# Patient Record
Sex: MALE | Race: WHITE | Employment: UNEMPLOYED | URBAN - METROPOLITAN AREA
[De-identification: names, ages, dates, MRNs, and addresses within clinical notes are randomized per-mention and may not be internally consistent; named-entity substitution may affect disease eponyms.]

---

## 2017-02-06 ENCOUNTER — OFFICE VISIT (OUTPATIENT)
Dept: FAMILY MEDICINE CLINIC | Age: 37
End: 2017-02-06

## 2017-02-06 VITALS
RESPIRATION RATE: 16 BRPM | WEIGHT: 135.6 LBS | HEART RATE: 120 BPM | DIASTOLIC BLOOD PRESSURE: 88 MMHG | SYSTOLIC BLOOD PRESSURE: 136 MMHG | TEMPERATURE: 99.1 F

## 2017-02-06 DIAGNOSIS — Z23 NEED FOR DIPHTHERIA-TETANUS-PERTUSSIS (TDAP) VACCINE: ICD-10-CM

## 2017-02-06 DIAGNOSIS — L30.9 ECZEMA, UNSPECIFIED TYPE: ICD-10-CM

## 2017-02-06 DIAGNOSIS — I10 ESSENTIAL HYPERTENSION: Primary | ICD-10-CM

## 2017-02-06 DIAGNOSIS — G80.9 CEREBRAL PALSY, UNSPECIFIED TYPE (HCC): ICD-10-CM

## 2017-02-06 PROCEDURE — 99214 OFFICE O/P EST MOD 30 MIN: CPT | Performed by: FAMILY MEDICINE

## 2017-02-06 RX ORDER — TRIAMCINOLONE ACETONIDE 1 MG/G
CREAM TOPICAL
Qty: 45 G | Refills: 1 | Status: SHIPPED | OUTPATIENT
Start: 2017-02-06

## 2017-02-06 RX ORDER — BENAZEPRIL HYDROCHLORIDE 10 MG/1
TABLET ORAL
Qty: 90 TABLET | Refills: 3 | Status: SHIPPED | OUTPATIENT
Start: 2017-02-06

## 2017-02-06 ASSESSMENT — ENCOUNTER SYMPTOMS
CONSTIPATION: 0
DIARRHEA: 0
NAUSEA: 0
SHORTNESS OF BREATH: 0
EYES NEGATIVE: 1
COUGH: 0
ABDOMINAL PAIN: 0

## 2023-03-07 DIAGNOSIS — I10 HTN (HYPERTENSION), BENIGN: Primary | ICD-10-CM

## 2023-03-07 RX ORDER — BENAZEPRIL HYDROCHLORIDE 10 MG/1
TABLET ORAL
Qty: 30 TABLET | Refills: 5 | Status: SHIPPED | OUTPATIENT
Start: 2023-03-07 | End: 2023-11-07

## 2023-11-07 DIAGNOSIS — I10 HTN (HYPERTENSION), BENIGN: ICD-10-CM

## 2023-11-07 RX ORDER — BENAZEPRIL HYDROCHLORIDE 10 MG/1
TABLET ORAL
Qty: 30 TABLET | Refills: 5 | Status: SHIPPED | OUTPATIENT
Start: 2023-11-07 | End: 2023-12-29 | Stop reason: SDUPTHER

## 2023-11-07 NOTE — TELEPHONE ENCOUNTER
LOV: over 6 months  Pt needs appt for further refills  Please ask if a short supply is needed  Thank you!

## 2023-11-07 NOTE — TELEPHONE ENCOUNTER
Lmom for patient to set up appointment for refills and request short supply if needed    [phone]    Please refuse med

## 2023-12-01 ENCOUNTER — APPOINTMENT (OUTPATIENT)
Dept: PRIMARY CARE | Facility: CLINIC | Age: 43
End: 2023-12-01
Payer: MEDICARE

## 2023-12-27 PROBLEM — I10 ESSENTIAL HYPERTENSION: Status: ACTIVE | Noted: 2023-12-27

## 2023-12-27 PROBLEM — G80.9 CEREBRAL PALSY (MULTI): Status: ACTIVE | Noted: 2023-12-27

## 2023-12-27 PROBLEM — F41.9 ANXIETY: Status: ACTIVE | Noted: 2023-12-27

## 2023-12-27 NOTE — PROGRESS NOTES
"Subjective   Patient ID: Brant Campos is a 43 y.o. male who presents for Hypertension and Medicare Annual Wellness Visit Subsequent.  HPI    Patient presents in office today for a Medicare AWV, and follow-up on HTN. Patient does sometimes check BP at home. Last eye exam 2-3 years ago, last dental exam 1 year ago. No new family h/o of cancer or heart disease.      Taking current medications which were reviewed.  Problem list discussed.    Overall doing well.  Eating okay.  Staying active.    Has no other new problem /question.     ROS  Constitutional- No activity change. No appetite change.  Eyes- Denies vision changes.  Respiratory- No shortness of breath.  Cardiovascular- No palpitations. No chest pain.  GI- No nausea or vomiting. No diarrhea or constipation. Denies abdominal pain.  Musculoskeletal- Denies joint swelling.  Extremities- No edema.  Neurological- Denies headaches. Denies dizziness.  Skin- No rashes.  Psychiatric/Behavioral- Denies significant anxiety, or depressed mood.     Objective     /80   Pulse 72   Temp 36.6 °C (97.8 °F) (Temporal)   Resp 18   Ht 1.753 m (5' 9\")   SpO2 98%     No Known Allergies    Constitutional-- Well-nourished.  No distress  Head- unremarkable.  Eyes- PERRL.  Conjunctiva normal.  Nose- Normal.  No rhinorrhea noted.  Throat- Oropharynx is clear and moist.  Neck- Supple with no thyromegaly.  No significant cervical adenopathy noted.  Pulmonary/Chest- Breath sounds normal with normal effort.  No wheezing.  Heart- Regular rate and rhythm.  No murmur.  Abdomen- Soft and non-tender.  No masses noted.  Musculoskeletal-legs weak bilateral which is chronic.  Doing well in wheelchair.  No significant joint swelling  Extremities- No edema.   Neurological- Alert.   Skin- Warm.  No rashes.  Psychiatric/Behavioral- Mood and affect normal.  Behavior normal.     Assessment/Plan   1. Medicare annual wellness visit, subsequent        2. Essential hypertension  CBC and Auto " Differential    Comprehensive Metabolic Panel    Lipid Panel      3. Cerebral palsy, unspecified type (CMS/HCC)  CBC and Auto Differential    Comprehensive Metabolic Panel      4. Anxiety        5. HTN (hypertension), benign  benazepril (Lotensin) 10 mg tablet             Long talk. Treatment options reviewed.    Medicare wellness questionnaire reviewed in detail.   No problems with activities of daily living. Home safety issues reviewed.   Reminded to have an updated will if needed.    Reviewed most recent lab work with patient. Advised patient to remain up to date on routine maintenance and health screening.     Hypertension controlled.     Counseled the patient on anxiety.     Continue and take your medications as prescribed.    Health Maintenance issues discussed.  Importance of healthy diet and regular exercise reviewed.  Patient and his mom admits she does not exercise consistently and we talked about doing the therapy that is been recommended  Importance of healthy diet and regular exercise regimen discussed.    We will contact you with any test results ordered. If you do not hear from us, please contact.    Follow-up as instructed or sooner if any problems or symptoms do not resolve as expected.      Scribe Attestation  By signing my name below, IMeagan Scribe   attest that this documentation has been prepared under the direction and in the presence of Dutch Hanson MD.

## 2023-12-29 ENCOUNTER — OFFICE VISIT (OUTPATIENT)
Dept: PRIMARY CARE | Facility: CLINIC | Age: 43
End: 2023-12-29
Payer: MEDICARE

## 2023-12-29 VITALS
RESPIRATION RATE: 18 BRPM | HEART RATE: 72 BPM | DIASTOLIC BLOOD PRESSURE: 80 MMHG | SYSTOLIC BLOOD PRESSURE: 120 MMHG | OXYGEN SATURATION: 98 % | TEMPERATURE: 97.8 F | HEIGHT: 69 IN

## 2023-12-29 DIAGNOSIS — G80.9 CEREBRAL PALSY, UNSPECIFIED TYPE (MULTI): ICD-10-CM

## 2023-12-29 DIAGNOSIS — F41.9 ANXIETY: ICD-10-CM

## 2023-12-29 DIAGNOSIS — I10 ESSENTIAL HYPERTENSION: ICD-10-CM

## 2023-12-29 DIAGNOSIS — Z00.00 MEDICARE ANNUAL WELLNESS VISIT, SUBSEQUENT: Primary | ICD-10-CM

## 2023-12-29 DIAGNOSIS — I10 HTN (HYPERTENSION), BENIGN: ICD-10-CM

## 2023-12-29 PROCEDURE — 3074F SYST BP LT 130 MM HG: CPT | Performed by: FAMILY MEDICINE

## 2023-12-29 PROCEDURE — 1036F TOBACCO NON-USER: CPT | Performed by: FAMILY MEDICINE

## 2023-12-29 PROCEDURE — 3079F DIAST BP 80-89 MM HG: CPT | Performed by: FAMILY MEDICINE

## 2023-12-29 PROCEDURE — G0439 PPPS, SUBSEQ VISIT: HCPCS | Performed by: FAMILY MEDICINE

## 2023-12-29 PROCEDURE — 99213 OFFICE O/P EST LOW 20 MIN: CPT | Performed by: FAMILY MEDICINE

## 2023-12-29 RX ORDER — BENAZEPRIL HYDROCHLORIDE 10 MG/1
10 TABLET ORAL DAILY
Qty: 90 TABLET | Refills: 3 | Status: SHIPPED | OUTPATIENT
Start: 2023-12-29

## 2023-12-29 RX ORDER — DIAZEPAM 2 MG/1
TABLET ORAL
COMMUNITY
Start: 2022-05-26

## 2023-12-29 ASSESSMENT — ACTIVITIES OF DAILY LIVING (ADL)
BATHING: INDEPENDENT
TAKING_MEDICATION: INDEPENDENT
DRESSING: INDEPENDENT
DOING_HOUSEWORK: INDEPENDENT
GROCERY_SHOPPING: NEEDS ASSISTANCE
MANAGING_FINANCES: TOTAL CARE

## 2023-12-29 ASSESSMENT — PATIENT HEALTH QUESTIONNAIRE - PHQ9
2. FEELING DOWN, DEPRESSED OR HOPELESS: NOT AT ALL
SUM OF ALL RESPONSES TO PHQ9 QUESTIONS 1 AND 2: 0
1. LITTLE INTEREST OR PLEASURE IN DOING THINGS: NOT AT ALL

## 2024-03-22 ENCOUNTER — OFFICE VISIT (OUTPATIENT)
Dept: PRIMARY CARE | Facility: CLINIC | Age: 44
End: 2024-03-22
Payer: MEDICARE

## 2024-03-22 VITALS
SYSTOLIC BLOOD PRESSURE: 104 MMHG | DIASTOLIC BLOOD PRESSURE: 80 MMHG | OXYGEN SATURATION: 96 % | TEMPERATURE: 97.6 F | RESPIRATION RATE: 16 BRPM | HEART RATE: 95 BPM

## 2024-03-22 DIAGNOSIS — R29.898 WEAKNESS OF BOTH LOWER EXTREMITIES: ICD-10-CM

## 2024-03-22 DIAGNOSIS — I10 ESSENTIAL HYPERTENSION: ICD-10-CM

## 2024-03-22 DIAGNOSIS — F41.9 ANXIETY: ICD-10-CM

## 2024-03-22 DIAGNOSIS — G80.9 CEREBRAL PALSY, UNSPECIFIED TYPE (MULTI): Primary | ICD-10-CM

## 2024-03-22 PROCEDURE — 3074F SYST BP LT 130 MM HG: CPT | Performed by: FAMILY MEDICINE

## 2024-03-22 PROCEDURE — 99213 OFFICE O/P EST LOW 20 MIN: CPT | Performed by: FAMILY MEDICINE

## 2024-03-22 PROCEDURE — 3079F DIAST BP 80-89 MM HG: CPT | Performed by: FAMILY MEDICINE

## 2024-03-22 NOTE — PROGRESS NOTES
Subjective   Patient ID: Brant Campos is a 44 y.o. male who presents for Wheelchair Evaluation.  HPI  Pt is being for possible paperwork for wheelchair evaluation   Needs face-to-face evaluation.    Taking current medications which were reviewed.  Problem list discussed.     Overall doing well.  Eating okay.  Currently using his regular wheelchair     Has no other new problem /question.       ROS  Constitutional- No activity change. No appetite change.  Eyes- Denies vision changes.  Respiratory- No shortness of breath.  Cardiovascular- No palpitations. No chest pain.  GI- No nausea or vomiting. No diarrhea or constipation. Denies abdominal pain.  Musculoskeletal-decreased leg strength and upper arm strength  Extremities- No edema.  Neurological- Denies headaches.  Occasional dizziness and off balance issues related to his cerebral palsy  Psychiatric/Behavioral- Denies significant anxiety, or depressed mood.     Objective     /80 (BP Location: Right arm, Patient Position: Sitting, BP Cuff Size: Adult)   Pulse 95   Temp 36.4 °C (97.6 °F) (Temporal)   Resp 16   SpO2 96%     No Known Allergies    Constitutional-- Well-nourished.  No distress  Head- unremarkable.  Eyes- PERRL.  Conjunctiva normal.  Nose- Normal.  No rhinorrhea noted.  Throat- Oropharynx is clear and moist.  Neck- Supple with no thyromegaly.  No significant cervical adenopathy noted.  Pulmonary/Chest- Breath sounds normal with normal effort.  No wheezing.  Heart- Regular rate and rhythm.  No murmur.  Abdomen- Soft and non-tender.  No masses noted.  Musculoskeletal-bilateral upper arm weakness 2 out of 3 with lower extremity weakness 1 out of 3 bilaterally.  Poor balance and muscle tone in the lower extremities related to cerebral palsy.  Mild spasm noted.  Extremities- No edema.   Neurological- Alert.  No noted deficits.  Skin- Warm.        Assessment/Plan   1. Cerebral palsy, unspecified type (CMS/HCC)  Referral to Physical Therapy      2.  Essential hypertension        3. Anxiety        4. Weakness of both lower extremities  Referral to Physical Therapy             Long talk. Treatment options reviewed.  Patient has been using his current wheelchair which is very old.  He would greatly benefit from a power wheelchair.  Given his cerebral palsy and current medical conditions a walker or wheelchair will not suffice.  He does have the ability to use a power wheelchair.  The power wheelchair will allow him to continue to engage in his activities of daily living at home and allow him to be at the home    Continue and take your medications as prescribed.  Blood pressure controlled  Health Maintenance issues discussed.    Importance of healthy diet and regular exercise regimen discussed.      Follow-up as instructed or sooner if any problems or symptoms do not resolve as expected.

## 2024-03-27 ENCOUNTER — TELEPHONE (OUTPATIENT)
Dept: PRIMARY CARE | Facility: CLINIC | Age: 44
End: 2024-03-27
Payer: MEDICARE

## 2024-03-27 NOTE — TELEPHONE ENCOUNTER
Angie from [employer] called about faxing over a guide for a doctor to amend the 3/22/2024 note. Nadeen says it has to say he fully needs to chair for insurance purposes.     Nadeen says a guide must be used and we did not receive it via fax. I left a voicemail telling her this.

## 2024-04-25 ENCOUNTER — EVALUATION (OUTPATIENT)
Dept: PHYSICAL THERAPY | Facility: CLINIC | Age: 44
End: 2024-04-25
Payer: MEDICARE

## 2024-04-25 DIAGNOSIS — R26.9 GAIT ABNORMALITY: ICD-10-CM

## 2024-04-25 DIAGNOSIS — R53.1 GENERALIZED WEAKNESS: ICD-10-CM

## 2024-04-25 DIAGNOSIS — R29.898 WEAKNESS OF BOTH LOWER EXTREMITIES: ICD-10-CM

## 2024-04-25 DIAGNOSIS — M62.838 MUSCLE SPASTICITY: Primary | ICD-10-CM

## 2024-04-25 DIAGNOSIS — G80.9 CEREBRAL PALSY, UNSPECIFIED TYPE (MULTI): ICD-10-CM

## 2024-04-25 PROCEDURE — 97162 PT EVAL MOD COMPLEX 30 MIN: CPT | Mod: GP | Performed by: PHYSICAL THERAPIST

## 2024-04-25 ASSESSMENT — ENCOUNTER SYMPTOMS
LOSS OF SENSATION IN FEET: 0
DEPRESSION: 0
OCCASIONAL FEELINGS OF UNSTEADINESS: 1

## 2024-04-25 NOTE — PROGRESS NOTES
Physical Therapy    Physical Therapy Evaluation and Treatment      Patient Name: Brant Campos  MRN: [id number]  Today's Date: 4/25/2024  Time Calculation  Start Time: 1636  Stop Time: 1720  Time Calculation (min): 44 min    Insurance: [facility] A/B 90D  -VIANCA supplemental     Visit Number: 1    Assessment:   Patient is a 44 y.o. male who presents to PT with h/o CP and reports of LE weakness and difficulty walking that has progressed over the past year. Impairments found on exam include moderate-severe LE spasticity, generalized LE weakness, abnormal gait mechanics, and impaired gait endurance. Patient required min-mod A for short ambulatory distances d/t quick onset of LE fatigue/knee buckling and postural instability, but only CGA for all other functional tasks (bed mobility, transfers, scooting). Times on the 5xSTS and TUG indicate high fall risk and impaired functional LE strength and gait stability respectively. These impairments are primarily limiting the patient's ability to walk which is severely impacting patient's ability to negotiate home and community environments safely and efficiently. The impairments and functional limitations outlined above indicate a need for skilled PT services to establish strong flexibility/strengthening HEP, improved gait safety and stability, and assist the patient in returning to his prior level of function.    Standardized testing and measures administered today reveal that the patient has 5 impairments in body structures and functions, activity limitations, and participation restrictions. The patient has1 personal factors and comorbidities that may serve as barriers affecting the plan of care, including CP. The patient's clinical presentation includes evolving characteristics as noted during today's evaluation, & these findings indicate that this patient is of moderate complexity.     Plan:  OP PT Plan  Treatment/Interventions: Education/ Instruction, Gait training, Manual therapy,  "Neuromuscular re-education, Self care/ home management, Therapeutic activities, Therapeutic exercises  PT Plan: Skilled PT  PT Frequency:  (1-2x/week)  Duration: 10 visits  Onset Date: 04/25/23  Certification Period Start Date: 04/25/24  Certification Period End Date: 07/24/24  Rehab Potential: Good  Plan of Care Agreement: Patient    Next Visit: establish stronger stretching/strengthening HEP, standing strength in // bars (squats, terminal knee extension marching, LE taps, sideways walking), standing tolerance/endurance    Current Problem:   1. Muscle spasticity        2. Cerebral palsy, unspecified type (Multi)  Referral to Physical Therapy      3. Weakness of both lower extremities  Referral to Physical Therapy      4. Generalized weakness        5. Gait abnormality            Subjective    General:   Patient arrives to clinic mobilizing in manual W/C w/ mother, \"Teri,\" and w/ h/o CP. Patient has a manual W/C and power W/C, uses manual W/C for work-related tasks and uses power W/C for home/outdoor negotiation. Patient works 4x/week for 4 hours each shift during which patient performs a lot of sitting. Patient states that he does not move around too much, mother reports maybe an hour per day. Patient's mother is concerned about patient's LE strength and walking mobility. No falls reported in the last year, but instances of intermittent knee buckling which leads to controlled lowers to ground. These instances have been happening since March 2024. Patient's mother also reports impaired endurance - patient reports that he is unable to walk for >2 minutes. Impairments have been developing over the past year. Patient has extensive history of LE surgery to reduce spasticity. Patient is not on Baclofen or other medications to reduce spasticity. Patient does not have AFOs or other braces. Patient denies pain.     Current Functional Limitations: Walking, stair negotiation, balance    Patient-Stated Goals: \"Strengthen legs " "and core. Be able to stand and use walker more.\"    Relevant PMH:  -CP  -HTN    Precautions:  Precautions  STEADI Fall Risk Score (The score of 4 or more indicates an increased risk of falling): 7  Precautions Comment: High fall risk  Home Living:   Lives in a 2-story home w/ basement-level bedroom and bathroom. Full flight of stairs to basement. Walk-in shower w/ shower chair and grab. 2 ADIN w/ bilateral handrail. Lives w/ mother.   Prior Level of Function:   Current: Able to walk w/ rollator for ~2-3 minutes prior to needing seated rest break, scooting down stairs to reach basement bedroom/bathroom, but able to ascend stairs on knees using handrail.     All screenings for spiritual/cultural beliefs, depression, suicide, human trafficking, and domestic violence were reviewed and negative.    Key Learner (barriers): none    Objective   Observation:   -Posture: seated in W/C w/ B knee valgus and forward flexed posture, slight L lateral trunk lean     Functional Movements:   -Gait: pt ambulates w/ crouched, scissoring gait utilizing rollator and requiring min-mod A to maintain stability and upright posture, significant B knee valgus noted (likely secondary to spasticity), B foot drag noted which lead to increased forward lean on rollator and minor LOB, inadequate proximity to device, moderate evidence of knee buckling   -Sit <> stand: pt performed w/ B UE support (one or both UE on WW) and CGA from PT, no evidence of knee buckling noted throughout 5 repetitions, mod I stability upon standing utilizing 2WW for UE support, slower performance     Strength (gross):   -Hip flexion: (R) 3+/5, (L) 4/5  -Hip ER: (R) unable to perform, (L) unable to perform   -Hip IR: (R) unable to perform, (L) unable to perform   -Knee ext: (R) 4/5, (L) 4/5  -Knee flexion: (R) 4/5, (L) 4/5  -Ankle DF: (R) 3/5, (L) 3/5  -Ankle PF: (R) 3/5, (L) 3/5    Sensation:   -Light touch: intact B LE     Spasticity:   -Ankle DF: (R) 2, (L) 1+  -Ankle PF: " (R) 3, (L) 2  -Ankle inv: (R) 2, (L) 2  -Ankle eversion: (R) 2, (L) 2  -Knee extensors: (R) 2, (L) 2  -Knee flexors: (R) 3, (L) 3  -Hip extensors: (R) 3, (L) 3  -Hip ADD: (R) 3, (L) 3  -Hip ABD: (R) 0, (L) 0  -Hip IR: (R) 2, (L) 3    Clonus: (-) B    Outcome Measures:  Timed Up and Go Test  TUG Manual: 82.19 (rollator, min-mod A)    Other Measures  5x Sit to Stand: 49.56s (CGA)     Treatments:  -Established HEP, patient performed several repetitions of each exercise to allow for assessment of performance accuracy  -Education, see section below    Access Code: [email or web address]  URL: [email or web address]  Date: 04/25/2024  Prepared by: Betsey Cedeño    Exercises  - Supine Hip Adductor Stretch  - 1 x daily - 7 x weekly - 3 sets - 60-120s hold  - Supine Knee Extension Stretch on Towel Roll  - 1 x daily - 7 x weekly - 3 sets - 60-120s hold  - Supine Ankle Dorsiflexion Stretch with Caregiver  - 1 x daily - 7 x weekly - 3 sets - 60-120s hold  - Supine Hamstring Stretch with Strap  - 1 x daily - 7 x weekly - 3 sets - 60s hold    EDUCATION:   Provided education on rationale behind PT diagnosis/prognosis, HEP, and PT POC. Discussed importance of stretching program to manage spasticity and how this will be developed over the course of PT POC. Patient verbalized understanding.     Goals:   By discharge,     1.) Patient will demonstrate independence with HEP focused on LE strengthening, stretching, and functional mobility to promote symptom relief and facilitate return to prior level of function.  2.) Time on 5xSTS will improve by 2.4s or improve to <35s to indicate improved functional LE strength and decreased risk of falls (Times >/= 12s = further need to assess fall risk in community dwelling older adults).   3.) Time on TUG will improve to <62s to indicate improved dynamic stability and decreased risk of falls (Times >13.5s = risk of falls in community dwelling adults).   4.) Patient will demonstrate ability  to ambulate >80ft w/ CGA and no evidence of knee buckling to indicate improved safety of ambulatory tasks and promote return to baseline function.   5.) Patient will demonstrate B LE strength >4/5 in all muscle groups to facilitate strength necessary for I/ADL performance and ambulatory tasks.   6.) Patient will demonstrate ability to ambulate for >4 minutes w/ rollator and CGA to promote return to PLOF.

## 2024-05-17 ENCOUNTER — TREATMENT (OUTPATIENT)
Dept: PHYSICAL THERAPY | Facility: CLINIC | Age: 44
End: 2024-05-17
Payer: MEDICARE

## 2024-05-17 DIAGNOSIS — M62.838 MUSCLE SPASTICITY: Primary | ICD-10-CM

## 2024-05-17 DIAGNOSIS — R53.1 GENERALIZED WEAKNESS: ICD-10-CM

## 2024-05-17 DIAGNOSIS — R26.9 GAIT ABNORMALITY: ICD-10-CM

## 2024-05-17 DIAGNOSIS — G80.9 CEREBRAL PALSY, UNSPECIFIED TYPE (MULTI): ICD-10-CM

## 2024-05-17 PROCEDURE — 97112 NEUROMUSCULAR REEDUCATION: CPT | Mod: GP | Performed by: PHYSICAL THERAPIST

## 2024-05-17 PROCEDURE — 97140 MANUAL THERAPY 1/> REGIONS: CPT | Mod: GP | Performed by: PHYSICAL THERAPIST

## 2024-05-17 NOTE — PROGRESS NOTES
Physical Therapy    Physical Therapy Treatment    Patient Name: Brant Campos  MRN: [id number]    Today's Date: 5/17/2024  Time Calculation  Start Time: 1546  Stop Time: 1615  Time Calculation (min): 29 min    Insurance: [employer] A/B 90D  -VIANCA supplemental      Visit Number: 2  -Authorized Visits: 10  -Authorized Date Range: 4/25/2024 - 7/24/2024    Assessment:   Patient arrived to the clinic ~15 minutes late leading to abbreviated session. Session focused heavily on updating mobility-based HEP to allow patient independence w/ stretching program at home. Verbalized and talked through strategies for independent performance w/ ankle DF, hamstrings, and adductors, as these muscle groups demonstrated the most spasticity on evaluation and will directly impact gait. Patient able to complete 2 bouts of standing endurance training, but required seated rest in between. W/ consistency performing HEP and on-time attendance in PT, patient should see steady gains in LE strength. At this time, patient requires skilled PT services to address ongoing impairments in LE strength, muscular endurance, gait impairments, spasticity, and activity tolerance to promote return to PLOF.    Plan:   Continue with POC as tolerated.     Next Visit: establish strengthening HEP (table strengthening, STS), standing strength in // bars (squats, terminal knee extension marching, LE taps, sideways walking), standing/walking tolerance/endurance     Current Problem  1. Muscle spasticity        2. Generalized weakness        3. Gait abnormality        4. Cerebral palsy, unspecified type (Multi)            Subjective    General  Patient arrives to the clinic mobilizing in manual W/C w/ mother. Patient states non-compliance w/ HEP and has no updates since last session. Patient's mother stated that patient must be able to perform stretching on own and requests additional strategies to allow for this.    Objective     Treatments:  Therapeutic Exercise (5  min):  -Established updated stretching-based HEP so patient can perform independently at home, discussed strategies for performance to assist patient in getting appropriate stretch     Manual Therapy (13 min):   -Passive stretching: to promote improved LE mobility prior to strengthening/walking   -Seated hip ADD, x 90s R/L   -Seated HS + PT OP at distal thigh, x 90s R/L    -Seated ankle DF + PT OP at distal thigh, x 90s R/L    Neuromuscular Re-Education (10 min):  In [place] harness:   -Standing w/ 2WW - to challenge and progress standing tolerance   -Trial 1: 58s   -Trial 2: ~35s, pt performed mini squats intermittently to keep self standing up, intermittent periods w/out UE support   -Set-up: ~4 min, seated rest breaks as needed    Current HEP:  Access Code: [email or web address]  URL: [email or web address]  Date: 05/17/2024  Prepared by: Betsey Cedeño    Exercises  - Long Sitting Calf Stretch with Strap  - 1 x daily - 7 x weekly - 3-5 sets - 60-120s hold  - Seated Hamstring Stretch with Chair  - 1 x daily - 7 x weekly - 3-5 sets - 60-120s hold  - Butterfly Groin Stretch  - 1 x daily - 7 x weekly - 3 sets - 60-120s hold    OP EDUCATION:   Provided education on the rationale behind interventions performed in the context of ongoing symptoms. Patient verbalized understanding.    Goals:  By discharge,      1.) Patient will demonstrate independence with HEP focused on LE strengthening, stretching, and functional mobility to promote symptom relief and facilitate return to prior level of function.  2.) Time on 5xSTS will improve by 2.4s or improve to <35s to indicate improved functional LE strength and decreased risk of falls (Times >/= 12s = further need to assess fall risk in community dwelling older adults).   3.) Time on TUG will improve to <62s to indicate improved dynamic stability and decreased risk of falls (Times >13.5s = risk of falls in community dwelling adults).   4.) Patient will demonstrate ability to  ambulate >80ft w/ CGA and no evidence of knee buckling to indicate improved safety of ambulatory tasks and promote return to baseline function.   5.) Patient will demonstrate B LE strength >4/5 in all muscle groups to facilitate strength necessary for I/ADL performance and ambulatory tasks.   6.) Patient will demonstrate ability to ambulate for >4 minutes w/ rollator and CGA to promote return to PLOF.

## 2024-05-22 ENCOUNTER — TREATMENT (OUTPATIENT)
Dept: PHYSICAL THERAPY | Facility: CLINIC | Age: 44
End: 2024-05-22
Payer: MEDICARE

## 2024-05-22 DIAGNOSIS — R53.1 GENERALIZED WEAKNESS: ICD-10-CM

## 2024-05-22 DIAGNOSIS — M62.838 MUSCLE SPASTICITY: Primary | ICD-10-CM

## 2024-05-22 DIAGNOSIS — G80.9 CEREBRAL PALSY, UNSPECIFIED TYPE (MULTI): ICD-10-CM

## 2024-05-22 DIAGNOSIS — R26.9 GAIT ABNORMALITY: ICD-10-CM

## 2024-05-22 PROCEDURE — 97112 NEUROMUSCULAR REEDUCATION: CPT | Mod: GP | Performed by: PHYSICAL THERAPIST

## 2024-05-22 NOTE — PROGRESS NOTES
Physical Therapy    Physical Therapy Treatment    Patient Name: Brant Campos  MRN: [id number]  Today's Date: 5/22/2024  Time Calculation  Start Time: 1503  Stop Time: 1547  Time Calculation (min): 44 min    Insurance: [employer] A/B 90D  -VIANCA supplemental      Visit Number: 3  -Authorized Visits: 10  -Authorized Date Range: 4/25/2024 - 7/24/2024    Assessment:   Session focused on promoting ambulatory tolerance/endurance as well as initiating functional strengthening. Patient tolerated well and was able to perform three ambulatory trials, two of which PT added challenge for swing/step length for improving mechanics. Despite theraband resistance at ankles, patient able to perform successful reciprocal stepping w/ appropriate noted effort. Patient demonstrated ability to perform active knee extension in standing when cued, but highly influenced by spasticity. At this time, patient requires skilled PT services to address ongoing impairments in LE strength, muscular endurance, gait impairments, spasticity, and activity tolerance to promote return to PLOF.    Plan:   Continue with POC as tolerated.     Next Visit: establish strengthening HEP (table strengthening, STS), standing strength in // bars (squats, terminal knee extension marching, LE taps, sideways walking), standing/walking tolerance/endurance     Current Problem  1. Muscle spasticity        2. Generalized weakness        3. Gait abnormality        4. Cerebral palsy, unspecified type (Multi)            Subjective    General  Patient arrives to the clinic mobilizing in manual W/C w/ mother. Patient states that he was able to perform exercises at home w/ success. States compliance w/ HEP and is w/out questions or concerns.    Objective     Treatments:  Neuromuscular Re-Education (38 min):  In [place] harness:   To progress ambulatory tolerance and ability:  -Standing/ambulating w/ rollator, x 3 min, CGA  -Ambulating w/ rollator + red TB placed around R ankle w/ resistance  posteriorly by PT, x 2 min - much improved step clearance and power  -Ambulating w/ rollator + red TB placed around B ankle w/ resistance posteriorly by PT, x 2 min - much improved step clearance and power    To improve LE NMR and functional strength for gait:   -Standing squats w/ B UE support on rollator, x 32  -Standing squats w/ ball in between thighs to reduce ADD tightness, x 12 - change in mechanics increased challenge of exercise     Current HEP:  Access Code: [email or web address]  URL: [email or web address]  Date: 05/17/2024  Prepared by: Betsey eCdeño    Exercises  - Long Sitting Calf Stretch with Strap  - 1 x daily - 7 x weekly - 3-5 sets - 60-120s hold  - Seated Hamstring Stretch with Chair  - 1 x daily - 7 x weekly - 3-5 sets - 60-120s hold  - Butterfly Groin Stretch  - 1 x daily - 7 x weekly - 3 sets - 60-120s hold    OP EDUCATION:   Provided education on the rationale behind interventions performed in the context of ongoing symptoms. Patient verbalized understanding.    Goals:  By discharge,      1.) Patient will demonstrate independence with HEP focused on LE strengthening, stretching, and functional mobility to promote symptom relief and facilitate return to prior level of function.  2.) Time on 5xSTS will improve by 2.4s or improve to <35s to indicate improved functional LE strength and decreased risk of falls (Times >/= 12s = further need to assess fall risk in community dwelling older adults).   3.) Time on TUG will improve to <62s to indicate improved dynamic stability and decreased risk of falls (Times >13.5s = risk of falls in community dwelling adults).   4.) Patient will demonstrate ability to ambulate >80ft w/ CGA and no evidence of knee buckling to indicate improved safety of ambulatory tasks and promote return to baseline function.   5.) Patient will demonstrate B LE strength >4/5 in all muscle groups to facilitate strength necessary for I/ADL performance and ambulatory tasks.    6.) Patient will demonstrate ability to ambulate for >4 minutes w/ rollator and CGA to promote return to PLOF.

## 2024-05-30 ENCOUNTER — TREATMENT (OUTPATIENT)
Dept: PHYSICAL THERAPY | Facility: CLINIC | Age: 44
End: 2024-05-30
Payer: MEDICARE

## 2024-05-30 DIAGNOSIS — G80.9 CEREBRAL PALSY, UNSPECIFIED TYPE (MULTI): ICD-10-CM

## 2024-05-30 DIAGNOSIS — R53.1 GENERALIZED WEAKNESS: ICD-10-CM

## 2024-05-30 DIAGNOSIS — R26.9 GAIT ABNORMALITY: ICD-10-CM

## 2024-05-30 DIAGNOSIS — M62.838 MUSCLE SPASTICITY: Primary | ICD-10-CM

## 2024-05-30 PROCEDURE — 97112 NEUROMUSCULAR REEDUCATION: CPT | Mod: GP | Performed by: PHYSICAL THERAPIST

## 2024-05-30 PROCEDURE — 97140 MANUAL THERAPY 1/> REGIONS: CPT | Mod: GP | Performed by: PHYSICAL THERAPIST

## 2024-05-30 NOTE — PROGRESS NOTES
Physical Therapy    Physical Therapy Treatment    Patient Name: Brant Campos  MRN: [id number]  Today's Date: 5/30/2024  Time Calculation  Start Time: 1550  Stop Time: 1630  Time Calculation (min): 40 min    Insurance: MCR A/B 90D  -VIAN[employer] supplemental      Visit Number: 4  -Authorized Visits: 10  -Authorized Date Range: 4/25/2024 - 7/24/2024    Assessment:   Session focused on promoting ambulatory tolerance/endurance as well as initiating functional strengthening. Patient tolerated well and demonstrated much improved activity tolerance w/ ability to ambulate/stand for up to 6.5 minutes vs 3 minutes last session. Patient continues to have difficulty w/ turning utilizing rollator which indicates ongoing need for gait stability. Overall, positive improvements are being seen. At this time, patient requires skilled PT services to address ongoing impairments in LE strength, muscular endurance, gait impairments, spasticity, and activity tolerance to promote return to PLOF.    Plan:   Continue with POC as tolerated.     Next Visit: establish strengthening HEP (table strengthening, STS), standing strength in // bars (squats, terminal knee extension marching, LE taps, sideways walking), standing/walking tolerance/endurance     Current Problem  1. Muscle spasticity        2. Generalized weakness        3. Gait abnormality        4. Cerebral palsy, unspecified type (Multi)            Subjective    General  Patient arrives to the clinic mobilizing in manual W/C w/ mother. Patient w/ no new reports from last session. States compliance w/ HEP and is w/out questions or concerns.    Objective     Treatments:  Neuromuscular Re-Education (30 min):  In [place] harness:   To progress ambulatory tolerance and ability:  -Standing/ambulating w/ rollator, x 6:41 min, CGA  -Ambulating w/ rollator + red TB placed around B ankle w/ resistance posteriorly by PT, x 6:27 min - much improved step clearance and power    To improve LE NMR and functional  strength for gait:   -Standing squats w/ B UE support on rollator + ball in between thighs to reduce ADD tightness, x 20     Manual Therapy (8 min):  -PROM hip ADD/femoral ER, x 2 min R/L  -PROM knee extension/tibial IR, x 2 min R/L     Current HEP:  Access Code: [email or web address]  URL: [email or web address]  Date: 05/17/2024  Prepared by: Betsey Cedeño    Exercises  - Long Sitting Calf Stretch with Strap  - 1 x daily - 7 x weekly - 3-5 sets - 60-120s hold  - Seated Hamstring Stretch with Chair  - 1 x daily - 7 x weekly - 3-5 sets - 60-120s hold  - Butterfly Groin Stretch  - 1 x daily - 7 x weekly - 3 sets - 60-120s hold    OP EDUCATION:   Provided education on the rationale behind interventions performed in the context of ongoing symptoms. Patient verbalized understanding.    Goals:  By discharge,      1.) Patient will demonstrate independence with HEP focused on LE strengthening, stretching, and functional mobility to promote symptom relief and facilitate return to prior level of function.  2.) Time on 5xSTS will improve by 2.4s or improve to <35s to indicate improved functional LE strength and decreased risk of falls (Times >/= 12s = further need to assess fall risk in community dwelling older adults).   3.) Time on TUG will improve to <62s to indicate improved dynamic stability and decreased risk of falls (Times >13.5s = risk of falls in community dwelling adults).   4.) Patient will demonstrate ability to ambulate >80ft w/ CGA and no evidence of knee buckling to indicate improved safety of ambulatory tasks and promote return to baseline function.   5.) Patient will demonstrate B LE strength >4/5 in all muscle groups to facilitate strength necessary for I/ADL performance and ambulatory tasks.   6.) Patient will demonstrate ability to ambulate for >4 minutes w/ rollator and CGA to promote return to PLOF.

## 2024-06-06 ENCOUNTER — TREATMENT (OUTPATIENT)
Dept: PHYSICAL THERAPY | Facility: CLINIC | Age: 44
End: 2024-06-06
Payer: MEDICARE

## 2024-06-06 DIAGNOSIS — R26.9 GAIT ABNORMALITY: ICD-10-CM

## 2024-06-06 DIAGNOSIS — R53.1 GENERALIZED WEAKNESS: ICD-10-CM

## 2024-06-06 DIAGNOSIS — M62.838 MUSCLE SPASTICITY: ICD-10-CM

## 2024-06-06 DIAGNOSIS — G80.9 CEREBRAL PALSY, UNSPECIFIED TYPE (MULTI): Primary | ICD-10-CM

## 2024-06-06 PROCEDURE — 97110 THERAPEUTIC EXERCISES: CPT | Mod: GP | Performed by: PHYSICAL THERAPIST

## 2024-06-06 NOTE — PROGRESS NOTES
Physical Therapy    Physical Therapy Treatment    Patient Name: Brant Campos  MRN: [id number]  Today's Date: 6/6/2024  Time Calculation  Start Time: 1633  Stop Time: 1715  Time Calculation (min): 42 min    Insurance: MCR A/B 90D  -[employer] supplemental      Visit Number: 5  -Authorized Visits: 10  -Authorized Date Range: 4/25/2024 - 7/24/2024    Assessment:   Session focused on promoting standing tolerance/endurance as well as progressing functional strengthening. Patient tolerated very well and was able to complete three consecutive bouts of standing in parallel bars for near 3 minutes each w/ very light UE support which indicates improving LE strength and endurance. Also responded very well to modified leg press and verbally reported activation in appropriate musculature. Will continue to explore this intervention. At this time, patient requires skilled PT services to address ongoing impairments in LE strength, muscular endurance, gait impairments, spasticity, and activity tolerance to promote return to PLOF.    Plan:   Continue with POC as tolerated.     Next Visit: establish strengthening HEP (table strengthening, STS), standing strength in // bars (squats, terminal knee extension marching, LE taps, sideways walking), standing/walking tolerance/endurance     Current Problem  1. Cerebral palsy, unspecified type (Multi)        2. Gait abnormality        3. Generalized weakness        4. Muscle spasticity              Subjective    General  Patient arrives to the clinic mobilizing in manual W/C w/ mother. Patient w/ no new reports from last session. States compliance w/ HEP and is w/out questions or concerns.    Objective     Treatments:  Neuromuscular Re-Education (38 min):  Overground in // bars w/ gait belt:     To improve standing tolerance:  -Standing trials w/ varying levels of UE support, x 3 (2:14 min, 3:14 min, 3:49 min)    To improve LE NMR and functional strength for gait:   -Seated LAQ w/ green TB, x 15 R/L    -Seated B hip ER w/ green TB, x 15 - improved strength and ROM near end repetitions  -Standing squats w/ B UE support on rollator + ball in between thighs to reduce ADD tightness, x 15   -Modified leg press, x 10 R/L (55#)    Current HEP:  Access Code: [email or web address]  URL: [email or web address]  Date: 05/17/2024  Prepared by: Betsey Cedeño    Exercises  - Long Sitting Calf Stretch with Strap  - 1 x daily - 7 x weekly - 3-5 sets - 60-120s hold  - Seated Hamstring Stretch with Chair  - 1 x daily - 7 x weekly - 3-5 sets - 60-120s hold  - Butterfly Groin Stretch  - 1 x daily - 7 x weekly - 3 sets - 60-120s hold    OP EDUCATION:   Provided education on the rationale behind interventions performed in the context of ongoing symptoms. Patient verbalized understanding.    Goals:  By discharge,      1.) Patient will demonstrate independence with HEP focused on LE strengthening, stretching, and functional mobility to promote symptom relief and facilitate return to prior level of function.  2.) Time on 5xSTS will improve by 2.4s or improve to <35s to indicate improved functional LE strength and decreased risk of falls (Times >/= 12s = further need to assess fall risk in community dwelling older adults).   3.) Time on TUG will improve to <62s to indicate improved dynamic stability and decreased risk of falls (Times >13.5s = risk of falls in community dwelling adults).   4.) Patient will demonstrate ability to ambulate >80ft w/ CGA and no evidence of knee buckling to indicate improved safety of ambulatory tasks and promote return to baseline function.   5.) Patient will demonstrate B LE strength >4/5 in all muscle groups to facilitate strength necessary for I/ADL performance and ambulatory tasks.   6.) Patient will demonstrate ability to ambulate for >4 minutes w/ rollator and CGA to promote return to PLOF.

## 2024-06-12 NOTE — PROGRESS NOTES
Physical Therapy    Physical Therapy Treatment    Patient Name: Brant Campos  MRN: [id number]  Today's Date: 6/13/2024  Time Calculation  Start Time: 1634  Stop Time: 1715  Time Calculation (min): 41 min    Insurance: MCR A/B 90D  -VIAN[employer] supplemental      Visit Number: 6  -Authorized Visits: 10  -Authorized Date Range: 4/25/2024 - 7/24/2024    Assessment:   Session focused on progressing standing/walking tolerance as well as LE strength/NMR. Patient tolerated very well and demonstrated improvements in LE power and standing endurance, demonstrating ability to stand for >3-4 minutes on all 3 trials. Overall, patient is progressing as expected, but continues to require skilled care to ensure safety of progressing standing and walking tolerance and stability. At this time, patient requires skilled PT services to address ongoing impairments in LE strength, muscular endurance, gait impairments, spasticity, and activity tolerance to promote return to PLOF.    Plan:   Continue with POC as tolerated.     Next Visit: establish strengthening HEP (table strengthening, STS), standing strength in // bars (squats, terminal knee extension marching, LE taps, sideways walking), standing/walking tolerance/endurance     Current Problem  1. Cerebral palsy, unspecified type (Multi)        2. Gait abnormality        3. Generalized weakness        4. Muscle spasticity            Subjective    General  Patient arrives to the clinic mobilizing in manual W/C w/ mother. Patient reports that stretching is going okay. States that he feels he is able to stand for longer than he used to be able to. States compliance w/ HEP and is w/out questions or concerns.    Objective     Treatments:  Neuromuscular Re-Education (38 min):  Overground in // bars w/ gait belt:     To improve standing/walking tolerance:  -Standing trials w/ varying levels of UE support, x 3 (4:08 min, 3:24 min, 4:17 min)  -Ambulation w/ B UE in // bars, 2 x 2 rounds FWD/BWD    To  improve LE NMR and functional strength for gait:   -Seated B hip ER w/ green TB, x 15, x 15 w/ yellow TB - improved strength and ROM near end repetitions  -Modified leg press, x 10 R/L (55#)    Current HEP:  Access Code: [email or web address]  URL: [email or web address]  Date: 05/17/2024  Prepared by: Betsey Cedeño    Exercises  - Long Sitting Calf Stretch with Strap  - 1 x daily - 7 x weekly - 3-5 sets - 60-120s hold  - Seated Hamstring Stretch with Chair  - 1 x daily - 7 x weekly - 3-5 sets - 60-120s hold  - Butterfly Groin Stretch  - 1 x daily - 7 x weekly - 3 sets - 60-120s hold    OP EDUCATION:   Provided education on the rationale behind interventions performed in the context of ongoing symptoms. Patient verbalized understanding.    Goals:  By discharge,      1.) Patient will demonstrate independence with HEP focused on LE strengthening, stretching, and functional mobility to promote symptom relief and facilitate return to prior level of function.  2.) Time on 5xSTS will improve by 2.4s or improve to <35s to indicate improved functional LE strength and decreased risk of falls (Times >/= 12s = further need to assess fall risk in community dwelling older adults).   3.) Time on TUG will improve to <62s to indicate improved dynamic stability and decreased risk of falls (Times >13.5s = risk of falls in community dwelling adults).   4.) Patient will demonstrate ability to ambulate >80ft w/ CGA and no evidence of knee buckling to indicate improved safety of ambulatory tasks and promote return to baseline function.   5.) Patient will demonstrate B LE strength >4/5 in all muscle groups to facilitate strength necessary for I/ADL performance and ambulatory tasks.   6.) Patient will demonstrate ability to ambulate for >4 minutes w/ rollator and CGA to promote return to PLOF.

## 2024-06-13 ENCOUNTER — TREATMENT (OUTPATIENT)
Dept: PHYSICAL THERAPY | Facility: CLINIC | Age: 44
End: 2024-06-13
Payer: MEDICARE

## 2024-06-13 DIAGNOSIS — M62.838 MUSCLE SPASTICITY: ICD-10-CM

## 2024-06-13 DIAGNOSIS — G80.9 CEREBRAL PALSY, UNSPECIFIED TYPE (MULTI): Primary | ICD-10-CM

## 2024-06-13 DIAGNOSIS — R53.1 GENERALIZED WEAKNESS: ICD-10-CM

## 2024-06-13 DIAGNOSIS — R26.9 GAIT ABNORMALITY: ICD-10-CM

## 2024-06-13 PROCEDURE — 97112 NEUROMUSCULAR REEDUCATION: CPT | Mod: GP | Performed by: PHYSICAL THERAPIST

## 2024-06-20 ENCOUNTER — TREATMENT (OUTPATIENT)
Dept: PHYSICAL THERAPY | Facility: CLINIC | Age: 44
End: 2024-06-20
Payer: MEDICARE

## 2024-06-20 DIAGNOSIS — M62.838 MUSCLE SPASTICITY: Primary | ICD-10-CM

## 2024-06-20 DIAGNOSIS — G80.9 CEREBRAL PALSY, UNSPECIFIED TYPE (MULTI): ICD-10-CM

## 2024-06-20 DIAGNOSIS — R53.1 GENERALIZED WEAKNESS: ICD-10-CM

## 2024-06-20 DIAGNOSIS — R26.9 GAIT ABNORMALITY: ICD-10-CM

## 2024-06-20 PROCEDURE — 97112 NEUROMUSCULAR REEDUCATION: CPT | Mod: GP | Performed by: PHYSICAL THERAPIST

## 2024-06-20 NOTE — PROGRESS NOTES
Physical Therapy    Physical Therapy Treatment    Patient Name: Brant Campos  MRN: [id number]  Today's Date: 6/20/2024  Time Calculation  Start Time: 1637  Stop Time: 1715  Time Calculation (min): 38 min    Insurance: MCR A/B 90D  -[facility] supplemental      Visit Number: 7  -Authorized Visits: 10  -Authorized Date Range: 4/25/2024 - 7/24/2024    Assessment:   Session focused on progressing standing/walking tolerance as well as LE strength/NMR. Patient tolerated well and demonstrated ability to ambulate for >5 minutes on several occasions w/ no additional reliance on harness system indicating improving LE strength/endurance. Patient continues to be able to compete strengthening interventions w/ minor difficulty d/t onset of fatigue, more fatigue noted today likely due to increased volume of consecutive walking. Overall, patient continues to demonstrate progress towards goals. At this time, patient requires skilled PT services to address ongoing impairments in LE strength, muscular endurance, gait impairments, spasticity, and activity tolerance to promote return to PLOF.    Plan:   Continue with POC as tolerated.     Next Visit: progress note    Current Problem  1. Muscle spasticity        2. Generalized weakness        3. Gait abnormality        4. Cerebral palsy, unspecified type (Multi)            Subjective    General  Patient arrives to the clinic mobilizing in manual W/C w/ mother. Patient w/ no new reports from last session. Continues to report improvements in endurance. States compliance w/ HEP and is w/out questions or concerns.    Objective     Treatments:  Neuromuscular Re-Education (38 min):  In [place] harness:  To improve walking tolerance:  -FWD ambulation + turns w/ rollator, x 5:29 min  -FWD ambulation w/ red TB around B ankles, x 8 min - to assist in propulsive strength and reduce impact of spasticity 4    To improve LE NMR for gait stability:   -Standing squats w/ ball b/t LE's to block ADD and B UE support  on rollator, x 12 - fatiguing today  -Modified leg press, x 10 R/L (60#)     Current HEP:  Access Code: [email or web address]  URL: [email or web address]  Date: 05/17/2024  Prepared by: Betsey Cedeño    Exercises  - Long Sitting Calf Stretch with Strap  - 1 x daily - 7 x weekly - 3-5 sets - 60-120s hold  - Seated Hamstring Stretch with Chair  - 1 x daily - 7 x weekly - 3-5 sets - 60-120s hold  - Butterfly Groin Stretch  - 1 x daily - 7 x weekly - 3 sets - 60-120s hold    OP EDUCATION:   Provided education on the rationale behind interventions performed in the context of ongoing symptoms. Patient verbalized understanding.    Goals:  By discharge,      1.) Patient will demonstrate independence with HEP focused on LE strengthening, stretching, and functional mobility to promote symptom relief and facilitate return to prior level of function.  2.) Time on 5xSTS will improve by 2.4s or improve to <35s to indicate improved functional LE strength and decreased risk of falls (Times >/= 12s = further need to assess fall risk in community dwelling older adults).   3.) Time on TUG will improve to <62s to indicate improved dynamic stability and decreased risk of falls (Times >13.5s = risk of falls in community dwelling adults).   4.) Patient will demonstrate ability to ambulate >80ft w/ CGA and no evidence of knee buckling to indicate improved safety of ambulatory tasks and promote return to baseline function.   5.) Patient will demonstrate B LE strength >4/5 in all muscle groups to facilitate strength necessary for I/ADL performance and ambulatory tasks.   6.) Patient will demonstrate ability to ambulate for >4 minutes w/ rollator and CGA to promote return to PLOF.

## 2024-06-27 ENCOUNTER — TREATMENT (OUTPATIENT)
Dept: PHYSICAL THERAPY | Facility: CLINIC | Age: 44
End: 2024-06-27
Payer: MEDICARE

## 2024-06-27 DIAGNOSIS — R26.9 GAIT ABNORMALITY: ICD-10-CM

## 2024-06-27 DIAGNOSIS — R53.1 GENERALIZED WEAKNESS: ICD-10-CM

## 2024-06-27 DIAGNOSIS — G80.9 CEREBRAL PALSY, UNSPECIFIED TYPE (MULTI): Primary | ICD-10-CM

## 2024-06-27 DIAGNOSIS — M62.838 MUSCLE SPASTICITY: ICD-10-CM

## 2024-06-27 PROCEDURE — 97530 THERAPEUTIC ACTIVITIES: CPT | Mod: GP | Performed by: PHYSICAL THERAPIST

## 2024-06-27 NOTE — PROGRESS NOTES
Physical Therapy    Physical Therapy Treatment (Progress Note)    Patient Name: Brant Campos  MRN: [id number]  Today's Date: 6/27/2024  Time Calculation  Start Time: 1635  Stop Time: 1715  Time Calculation (min): 40 min    Insurance: MCR A/B 90D  -[facility] supplemental      Visit Number: 8  -Authorized Visits: 10  -Authorized Date Range: 4/25/2024 - 7/24/2024    Assessment:   Session focused on performing reassessment d/t patient nearing end of original POC and [facility] 90D certification period. Subjectively, patient has consistently reported improvements in strength and endurance throughout POC. Objectively, patient w/ significant improvements in LE strength, ambulatory endurance, and ambulatory stability. Times on 5xSTS and TUG improved significantly from initial evaluation. Times still place patient at a high risk of falls, but indicate improvements in functional LE strength, static postural stability, and dynamic gait stability. Isolated strength improved in proximal and distal LE musculature which is likely contributing to functional improvements seen. Patient also demonstrated ability to ambulate for >4 minutes w/ no physical assist (compared to mod A for 1.5 minutes of walking on initial evaluation) and only minor form fatigue noted near end of ambulatory trial. Patient has met 5/6 goals. Additional goals set today d/t progress see. Due to the above information, PT recommending additional 6 visits to continue progressing strength and mobility. Updated HEP handout provided d/t improvements seen. At this time, patient requires skilled PT services to address ongoing impairments in LE strength, muscular endurance, gait impairments, spasticity, and activity tolerance to promote return to PLOF.    Plan:   Continue with POC as tolerated. Request extension of PT POC by 6 visits.    Current Problem  1. Cerebral palsy, unspecified type (Multi)        2. Gait abnormality        3. Generalized weakness        4. Muscle spasticity             Subjective    General  Patient arrives to the clinic mobilizing in manual W/C w/ mother. Patient w/ no new reports since last session. States compliance w/ HEP and is w/out questions or concerns.    Objective   Strength (gross):   -Hip flexion: (R) 4/5, (L) 4+/5  -Knee ext: (R) 4/5, (L) 4/5  -Knee flexion: (R) 3/5, (L) 3/5  -Ankle DF: (R) 4+/5, (L) 4+/5  -Ankle PF: (R) 4+/5, (L) 4+/5    Functional Movements:  -Gait: pt ambulates w/ crouched, scissoring gait utilizing rollator and requiring SBA - CGA throughout 4 minutes of ambulation, significant B knee valgus noted secondary to spasticity, moderate B foot drag, and significant external rotation of R ankle    Outcome Measures:  Timed Up and Go Test  TUG Manual: 57.00s    Other Measures  5x Sit to Stand: 22.93s (CGA)    Treatments:  Therapeutic Activity (61639): 38 minutes  -Reassessment of 5xSTS, see objective section  -Reassessment of TUG, see objective section   -Reassessment of gait, see objective section  -Reassessment of strength, see objective section  -Education, see section below  -Updated HEP to reflect progress observed, see below    Current HEP:  Access Code: [email or web address]  URL: [email or web address]  Date: 05/17/2024  Prepared by: Betsey Cedeño    Exercises  - Long Sitting Calf Stretch with Strap  - 1 x daily - 7 x weekly - 3-5 sets - 60-120s hold  - Seated Hamstring Stretch with Chair  - 1 x daily - 7 x weekly - 3-5 sets - 60-120s hold  - Butterfly Groin Stretch  - 1 x daily - 7 x weekly - 3 sets - 60-120s hold  - Seated Hip Abduction  - 1 x daily - 7 x weekly - 3 sets - 10 reps  - Mini Squat with Counter Support  - 1 x daily - 7 x weekly - 2-3 sets - 15 reps  - Seated Knee Flexion Slide  - 1 x daily - 7 x weekly - 3 sets - 10 reps    OP EDUCATION:  Provided education on the results of outcome measures, interpretation of scores, and impact on PT POC. Discussed extending PT POC d/t significant improvements seen to progress  patient back to full PLOF. Provided rationale behind updated HEP based on progress seen and to continue progressing strength outside of the clinic. Patient verbalized understanding and patient and mother verbalized agreement to plan.    Goals:  By discharge,      1.) Patient will demonstrate independence with HEP focused on LE strengthening, stretching, and functional mobility to promote symptom relief and facilitate return to prior level of function. - MET 6/27/2024  2.) Time on 5xSTS will improve by 2.4s or improve to <35s to indicate improved functional LE strength and decreased risk of falls (Times >/= 12s = further need to assess fall risk in community dwelling older adults). - MET 6/27/2024  3.) Time on TUG will improve to <62s to indicate improved dynamic stability and decreased risk of falls (Times >13.5s = risk of falls in community dwelling adults). - MET 6/27/2024  4.) Patient will demonstrate ability to ambulate >80ft w/ CGA and no evidence of knee buckling to indicate improved safety of ambulatory tasks and promote return to baseline function. - MET 6/27/2024  5.) Patient will demonstrate B LE strength >4/5 in all muscle groups to facilitate strength necessary for I/ADL performance and ambulatory tasks. - ONGOING 6/27/2024, positive progress  6.) Patient will demonstrate ability to ambulate for >4 minutes w/ rollator and CGA to promote return to PLOF. - MET 6/27/2024  7.) Time on 5xSTS will improve by 2.4s or improve to <18s to indicate improved functional LE strength and decreased risk of falls (Times >/= 12s = further need to assess fall risk in community dwelling older adults). - NEW 6/27/2024  8.) Time on TUG will improve to <52s to indicate improved dynamic stability and decreased risk of falls (Times >13.5s = risk of falls in community dwelling adults). - NEW 6/27/2024  9.) Patient will demonstrate ability to ambulate for >7 minutes w/ rollator and CGA to promote return to PLOF. - NEW 6/27/2024

## 2024-07-31 ENCOUNTER — APPOINTMENT (OUTPATIENT)
Dept: PHYSICAL THERAPY | Facility: CLINIC | Age: 44
End: 2024-07-31
Payer: MEDICARE

## 2024-08-05 ENCOUNTER — APPOINTMENT (OUTPATIENT)
Dept: PHYSICAL THERAPY | Facility: CLINIC | Age: 44
End: 2024-08-05
Payer: MEDICARE

## 2024-08-19 ENCOUNTER — APPOINTMENT (OUTPATIENT)
Dept: PHYSICAL THERAPY | Facility: CLINIC | Age: 44
End: 2024-08-19
Payer: MEDICARE

## 2024-09-04 ENCOUNTER — APPOINTMENT (OUTPATIENT)
Dept: PHYSICAL THERAPY | Facility: CLINIC | Age: 44
End: 2024-09-04
Payer: MEDICARE

## 2024-09-16 ENCOUNTER — APPOINTMENT (OUTPATIENT)
Dept: PHYSICAL THERAPY | Facility: CLINIC | Age: 44
End: 2024-09-16
Payer: MEDICARE

## 2024-09-30 ENCOUNTER — APPOINTMENT (OUTPATIENT)
Dept: PHYSICAL THERAPY | Facility: CLINIC | Age: 44
End: 2024-09-30
Payer: MEDICARE

## 2025-02-08 DIAGNOSIS — I10 HTN (HYPERTENSION), BENIGN: ICD-10-CM

## 2025-02-13 RX ORDER — BENAZEPRIL HYDROCHLORIDE 10 MG/1
10 TABLET ORAL DAILY
Qty: 90 TABLET | Refills: 3 | OUTPATIENT
Start: 2025-02-13

## 2025-02-18 DIAGNOSIS — I10 HTN (HYPERTENSION), BENIGN: ICD-10-CM

## 2025-02-18 RX ORDER — BENAZEPRIL HYDROCHLORIDE 10 MG/1
10 TABLET ORAL DAILY
Qty: 30 TABLET | Refills: 0 | Status: SHIPPED | OUTPATIENT
Start: 2025-02-18

## 2025-02-18 NOTE — TELEPHONE ENCOUNTER
PATIENT IS OUT OF MEDICATION.    Rx Refill Request Telephone Encounter    Name:  Brant Campos  : 1980     benazepril (Lotensin) 10 mg tablet [[id number]]    Order Details  Dose: 10 mg Route: oral Frequency: Daily   Dispense Quantity: 30 tablet Refills: 0          Sig: Take 1 tablet (10 mg) by mouth once daily.       ALLERGIES:   NKDA    Specific Pharmacy location:  [facility]    Date of last appointment:  2024  Date of next appointment:  2025    Best number to reach patient:  [phone]

## 2025-03-03 ENCOUNTER — APPOINTMENT (OUTPATIENT)
Dept: PRIMARY CARE | Facility: CLINIC | Age: 45
End: 2025-03-03
Payer: MEDICARE

## 2025-03-03 VITALS
OXYGEN SATURATION: 94 % | DIASTOLIC BLOOD PRESSURE: 80 MMHG | RESPIRATION RATE: 18 BRPM | HEIGHT: 69 IN | SYSTOLIC BLOOD PRESSURE: 120 MMHG | HEART RATE: 108 BPM

## 2025-03-03 DIAGNOSIS — I10 ESSENTIAL HYPERTENSION: ICD-10-CM

## 2025-03-03 DIAGNOSIS — Z00.00 MEDICARE ANNUAL WELLNESS VISIT, SUBSEQUENT: Primary | ICD-10-CM

## 2025-03-03 DIAGNOSIS — I10 HTN (HYPERTENSION), BENIGN: ICD-10-CM

## 2025-03-03 DIAGNOSIS — E78.00 HYPERCHOLESTEROLEMIA: ICD-10-CM

## 2025-03-03 DIAGNOSIS — G80.9 CEREBRAL PALSY, UNSPECIFIED TYPE (MULTI): ICD-10-CM

## 2025-03-03 PROCEDURE — 3074F SYST BP LT 130 MM HG: CPT | Performed by: FAMILY MEDICINE

## 2025-03-03 PROCEDURE — 1036F TOBACCO NON-USER: CPT | Performed by: FAMILY MEDICINE

## 2025-03-03 PROCEDURE — 99214 OFFICE O/P EST MOD 30 MIN: CPT | Performed by: FAMILY MEDICINE

## 2025-03-03 PROCEDURE — 3079F DIAST BP 80-89 MM HG: CPT | Performed by: FAMILY MEDICINE

## 2025-03-03 PROCEDURE — G0439 PPPS, SUBSEQ VISIT: HCPCS | Performed by: FAMILY MEDICINE

## 2025-03-03 RX ORDER — BENAZEPRIL HYDROCHLORIDE 10 MG/1
10 TABLET ORAL DAILY
Qty: 90 TABLET | Refills: 3 | Status: SHIPPED | OUTPATIENT
Start: 2025-03-03

## 2025-03-03 ASSESSMENT — PATIENT HEALTH QUESTIONNAIRE - PHQ9
SUM OF ALL RESPONSES TO PHQ9 QUESTIONS 1 AND 2: 0
2. FEELING DOWN, DEPRESSED OR HOPELESS: NOT AT ALL
1. LITTLE INTEREST OR PLEASURE IN DOING THINGS: NOT AT ALL

## 2025-03-03 ASSESSMENT — ACTIVITIES OF DAILY LIVING (ADL)
MANAGING_FINANCES: TOTAL CARE
GROCERY_SHOPPING: TOTAL CARE
DOING_HOUSEWORK: INDEPENDENT
TAKING_MEDICATION: INDEPENDENT
DRESSING: INDEPENDENT
BATHING: INDEPENDENT

## 2025-03-03 NOTE — PROGRESS NOTES
"Subjective   Patient ID: Brant Campos is a 44 y.o. male who presents for Hypertension and Medicare Annual Wellness Visit Subsequent.  HPI    Medicare annual wellness visit     HTN follow up  Denies chest pain,SOB, swelling, headaches, lightheadedness or dizziness.   Eats a generally healthy diet, Exercises.   Does not check BP at home.   Medication working well.      Taking current medications which were reviewed.  Problem list discussed.    Overall doing well.  Eating okay.  Staying active.    Has no other new problem /question.     ROS  Constitutional- No activity change. No appetite change.  Eyes- Denies vision changes.  Respiratory- No shortness of breath.  Cardiovascular- No palpitations. No chest pain.  GI- No nausea or vomiting. No diarrhea or constipation. Denies abdominal pain.  Musculoskeletal- Denies joint swelling.  Extremities- No edema.  Neurological- Denies headaches. Denies dizziness.  Skin- No rashes.  Psychiatric/Behavioral- Denies significant anxiety, or depressed mood.     Objective     /80   Pulse 108   Resp 18   Ht 1.753 m (5' 9\")   SpO2 94%     No Known Allergies    Constitutional-- Well-nourished.  No distress  Head- unremarkable.  Eyes- PERRL.  Conjunctiva normal.  Nose- Normal.  No rhinorrhea noted.  Throat- Oropharynx is clear and moist.  Neck- Supple with no thyromegaly.  No significant cervical adenopathy noted.  Pulmonary/Chest- Breath sounds normal with normal effort.  No wheezing.  Heart- Regular rate and rhythm.  No murmur.  Abdomen- Soft and non-tender.  No masses noted.  Musculoskeletal-generalized leg weakness which is chronic for him.  Was in a wheelchair today.  Extremities-trace leg swelling above the ankles bilateral  Neurological- Alert.  No noted deficits.  Skin- Warm.  No rashes.  Psychiatric/Behavioral- Mood and affect normal.  Behavior normal.     Assessment/Plan   1. Medicare annual wellness visit, subsequent        2. Essential hypertension        3. " Cerebral palsy, unspecified type (Multi)  Disability Placard    CBC and Auto Differential    Comprehensive Metabolic Panel    CBC and Auto Differential    Comprehensive Metabolic Panel      4. HTN (hypertension), benign  benazepril (Lotensin) 10 mg tablet    CBC and Auto Differential    Comprehensive Metabolic Panel    CBC and Auto Differential    Comprehensive Metabolic Panel      5. Hypercholesterolemia  Lipid Panel    Lipid Panel             Long talk with patient and mom. Treatment options reviewed.  Spent 30 minutes with the patient, with at least 1/2 of the time spent in counseling and instruction.    Medicare wellness questionnaire reviewed in detail. Advanced Directives reviewed today, Importance of having Advance care planing discussed. Patient advised to provide the office with a copy if has not already done so. No problems with activities of daily living. Falls risks reviewed.      Hypertension controlled  Anxiety stable  Cerebral palsy stable.  Spent a great deal of time enforcing the need to exercise and do range of motion.  Continue and take your medications as prescribed.    Health Maintenance issues discussed.    Importance of healthy diet and regular exercise regimen discussed.    We will contact you with any test results ordered. If you do not hear from us, please contact.    Follow-up as instructed or sooner if any problems or symptoms do not resolve as expected.